# Patient Record
(demographics unavailable — no encounter records)

---

## 2025-04-24 NOTE — ASSESSMENT
[FreeTextEntry1] : 41 yo M with hx elevated liver enzymes and hepatic steatosis most likely MetALD.  Last lab work done in 2/2025 noted elevated AST/ALT with thrombocytopenia raises concern for advance fibrosis.  No signs of decompensation.  Will complete liver work up today.  Will obtain US ABD evaluate liver morphology.  Will schedule fibroscan in 2 months to evaluate fibrosis stage.   Counseled on alcohol use and the progression of liver disease. Encourage complete abstinent to alcohol. Option to refer to AA or RPP program if needed.  Counseled on natural hx of fatty liver disease Counseled on diet - limiting carbs and increasing protein and vegetable intake 30min exercise daily Goal 7-10% weight loss  RTC in 2 months with fibroscan done prior to the visit.

## 2025-04-24 NOTE — HISTORY OF PRESENT ILLNESS
[FreeTextEntry1] : [Care team] PCP: Dr. Elpidio Deulca  [Referral Reason] Transaminitis, steatosis   41 yo M with hx elevated liver enzymes and hepatic steatosis referred to hepatology clinic for evaluation.   First noted elevated LFTs in 2024 5/30/2024  ALT 85 ALKP 71 TB 0.8  HIV negative  US done in 7/11/2024 the liver parenchyma diffusely increased and heterogeneous echogenicity compatible with moderately severe diffuse hepatic steatosis. Gallbladder is unremarkable.   Had recent admission to [facility] for syncope episode which was most likely vasovagal. noted with elevated liver enzymes AST and ALT in the 100s. Normal bilirubin.   Alcohol use: 1-2 beers daily on the weekday. Friday and Saturday will have 3 Cups of liquor. average 20 servings a week. Denies hx of DUI or withdrawal. Reports periods of sobriety longest was about 8 months.  Patient reports weight loss about 50lbs over the past 6 years (was around 200lbs). With healthier diet and exercise per patient.   Denies hx of hospitalization due to liver disease. Denies hx of hepatitis. Denies fever, chills, nausea, vomiting, jaundice, melena, maroon stool, abd pain, confusion, or unintentional weight loss.  FHX of liver disease: paternal grandfather with liver cancer at age 70s unclear etiology   Illicit drug use: none.  Smoking history: never.  OTC or herbals supplements: none.   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  No active meds.

## 2025-04-24 NOTE — PHYSICAL EXAM
[General Appearance - Alert] : alert [General Appearance - In No Acute Distress] : in no acute distress [Scleral Icterus] : No Scleral Icterus [Spider Angioma] : No spider angioma(s) were observed [Abdominal  Ascites] : no ascites [Scrotal Edema] : Scrotum is not edematous [Asterixis] : no asterixis observed [Jaundice] : No jaundice [Depression] : no depression

## 2025-04-24 NOTE — REVIEW OF SYSTEMS
[Fever] : no fever [Chills] : no chills [Chest Pain] : no chest pain [Palpitations] : no palpitations [Shortness Of Breath] : no shortness of breath [Wheezing] : no wheezing [Abdominal Pain] : no abdominal pain [Constipation] : no constipation [Diarrhea] : no diarrhea

## 2025-04-24 NOTE — HISTORY OF PRESENT ILLNESS
[FreeTextEntry1] : [Care team] PCP: Dr. Elpidio Deluca  [Referral Reason] Transaminitis, steatosis   41 yo M with hx elevated liver enzymes and hepatic steatosis referred to hepatology clinic for evaluation.   First noted elevated LFTs in 2024 5/30/2024  ALT 85 ALKP 71 TB 0.8  HIV negative  US done in 7/11/2024 the liver parenchyma diffusely increased and heterogeneous echogenicity compatible with moderately severe diffuse hepatic steatosis. Gallbladder is unremarkable.   Had recent admission to [facility] for syncope episode which was most likely vasovagal. noted with elevated liver enzymes AST and ALT in the 100s. Normal bilirubin.   Alcohol use: 1-2 beers daily on the weekday. Friday and Saturday will have 3 Cups of liquor. average 20 servings a week. Denies hx of DUI or withdrawal. Reports periods of sobriety longest was about 8 months.  Patient reports weight loss about 50lbs over the past 6 years (was around 200lbs). With healthier diet and exercise per patient.   Denies hx of hospitalization due to liver disease. Denies hx of hepatitis. Denies fever, chills, nausea, vomiting, jaundice, melena, maroon stool, abd pain, confusion, or unintentional weight loss.  FHX of liver disease: paternal grandfather with liver cancer at age 70s unclear etiology   Illicit drug use: none.  Smoking history: never.  OTC or herbals supplements: none.   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  No active meds.